# Patient Record
(demographics unavailable — no encounter records)

---

## 2024-10-29 NOTE — ASSESSMENT
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  I have consulted with the pathologist who consulted on the core bx, which was read as mod diff.  However, review of the mast, grade was increased to 3 based on mitotic count on the excision, nuclear grade was 2 in both samplings, Ki-67 was 25% on core and 30% on mast, in retrospect core was representative of the invasive carcinoma and there is no reason to repeat the Oncotype testing.  Oncotype was 15 on core.  Path and prognosis reviewed in detail with the pt.  Pt's risk for distant recurrence using node pos Oncotype 15 is in the range of 14%.  However - case reviewed at Tumor Board with detailed slide review - Pathology slides were reviewed, and case discussed at Tumor Board 01/12/22.  Consensus was to consider chemo.  Aggressive tumor as per pathology review despite Oncotype 15. Data is limited regarding Oncotype in males.  Discussed chemotherapy regimen TC -S/P  - suspect allergic rxn to docetaxel. S/P 1 cycle CTX D1 with abraxane D1, 8,  and 15 - tolerated well. S/P RT to cw and regional LN. Metcalf 6/27/22 - to continue takes occ break for sexual dysfx. Discussed METCALF SE using Viagra. Alternative would be lupron with AI - no sexual fx.Holds Metcalf intermittently for sexual fx. Reviewed exercise diet, and hydration. Continue Metcalf.   Mammogram/US left 12/24. CBC, chem and markers. F/U BP - wnl at home . Colonoscopy with UGI 7/25.Urol eval 12/24. Reviewed BMD in detail. F/U 6 months  [AdvancecareDate] : 10/29/24

## 2024-10-29 NOTE — ASSESSMENT
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  I have consulted with the pathologist who consulted on the core bx, which was read as mod diff.  However, review of the mast, grade was increased to 3 based on mitotic count on the excision, nuclear grade was 2 in both samplings, Ki-67 was 25% on core and 30% on mast, in retrospect core was representative of the invasive carcinoma and there is no reason to repeat the Oncotype testing.  Oncotype was 15 on core.  Path and prognosis reviewed in detail with the pt.  Pt's risk for distant recurrence using node pos Oncotype 15 is in the range of 14%.  However - case reviewed at Tumor Board with detailed slide review - Pathology slides were reviewed, and case discussed at Tumor Board 01/12/22.  Consensus was to consider chemo.  Aggressive tumor as per pathology review despite Oncotype 15. Data is limited regarding Oncotype in males.  Discussed chemotherapy regimen TC -S/P  - suspect allergic rxn to docetaxel. S/P 1 cycle CTX D1 with abraxane D1, 8,  and 15 - tolerated well. S/P RT to cw and regional LN. Metcalf 6/27/22 - to continue takes occ break for sexual dysfx. Discussed METCALF SE using Viagra. Alternative would be lupron with AI - no sexual fx.Holds Metcalf intermittently for sexual fx. Reviewed exercise diet, and hydration. Continue Metcalf.   Mammogram/US left 12/24. CBC, chem and markers. F/U BP - wnl at home . Colonoscopy with UGI 7/25.Urol eval 12/24. Reviewed BMD in detail. F/U 6 months  [AdvancecareDate] : 10/29/24

## 2024-10-29 NOTE — BEGINNING OF VISIT
[[id number]] : 0 [Pain Scale: ___] : On a scale of 1-10, today the patient's pain is a(n) [unfilled]. [Diarrhea Character] : Diarrhea: Grade 0 [0] : 2) Feeling down, depressed, or hopeless: Not at all (0) [Future Reassessment of Pain Scale] : Future reassessment of pain scale [Never] : Never [Reviewed updated] : Reviewed updated [Abdominal Pain] : no abdominal pain [Vomiting] : no vomiting [Constipation] : no constipation

## 2024-10-29 NOTE — ASSESSMENT
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  I have consulted with the pathologist who consulted on the core bx, which was read as mod diff.  However, review of the mast, grade was increased to 3 based on mitotic count on the excision, nuclear grade was 2 in both samplings, Ki-67 was 25% on core and 30% on mast, in retrospect core was representative of the invasive carcinoma and there is no reason to repeat the Oncotype testing.  Oncotype was 15 on core.  Path and prognosis reviewed in detail with the pt.  Pt's risk for distant recurrence using node pos Oncotype 15 is in the range of 14%.  However - case reviewed at Tumor Board with detailed slide review - Pathology slides were reviewed, and case discussed at Tumor Board 01/12/22.  Consensus was to consider chemo.  Aggressive tumor as per pathology review despite Oncotype 15. Data is limited regarding Oncotype in males.  Discussed chemotherapy regimen TC -S/P  - suspect allergic rxn to docetaxel. S/P 1 cycle CTX D1 with abraxane D1, 8,  and 15 - tolerated well. S/P RT to cw and regional LN. Metcalf 6/27/22 - to continue takes occ break for sexual dysfx. Discussed METCALF SE using Viagra. Alternative would be lupron with AI - no sexual fx.Holds Metcalf intermittently for sexual fx. Reviewed exercise diet, and hydration. Continue Metcalf.   Mammogram/US left 12/24. CBC, chem and markers. F/U BP - wnl at home . Colonoscopy with UGI 7/25.Urol eval 12/24. Reviewed BMD in detail. F/U 6 months  [AdvancecareDate] : 10/29/24

## 2024-10-29 NOTE — HISTORY OF PRESENT ILLNESS
[de-identified] : No family Hx of breast CA, pt has personal and family hx prostate CA. Several months ago palpated density Rt breast, subsequently noted nipple retraction.  He promptly sought medical attention and workup included mammo and US, confirmed the presence of a 2.7 cm mass upper outer subareolar region corresponding to the palpable mass, and Lt gynecomastia.  US-guided bx revealed IDC and an axillary LN FNA was consistent with metastatic disease.  Path - mod diff IDC, ER pos, HER-2/caitlin neg.  PET/CT remarkable for Rt breast avidity and Rt axillary adenopathy, minimally avid Rt lower lobe atelectasis/consolidation with associated bronchiectasis, probably inflammatory, attention on F/U, non-avid Rt sclerotic bone lesion, probably benign, less likely metastatic.  21, Rt mast and axillary dissection, poorly diff IDC grade 3, (3, 2, 3) with necrosis, solid papillary features, measures 2.5 cm, involves dermis of the nipple without epidermal involvement, no definite LVI, DCIS focally present with intermediate grade nuclei, margins neg for invasive carcinoma and DCIS.  / SLN pos 0.5 cm,  LNs pos for metastatic disease, 1.4 cm with extranodal extension, ER and [place] >95%, Ki-67 30% and HER-2 2+.  Oncotype testing was done prior to surgery on core bx - ORS 15,  P/O course eventful for seroma -drain ed again last week - has not reaccumulated. Getting PT locally. Pathology slides were reviewed and case discussed at Tumor Board 22 -  Consensus consider chemo.  Pt has an aggressive tumor as per pathology review despite Oncotype 15. Data is limited regarding Oncotype in males.  Pt has been seen for RT. 22 TC#1,   Spoke to the pt tat 7 PM and again at 10:30 PM.  After eating dinner a steak and lettuce, patient felt queasy, had frequent runny watery stool.  At 7 PM, patient took Imodium and again at 10 PM, he took Imodium again.  Patient also felt queasy, he took Ondansetron.  He has no cramping, abdominal pain, fever, urinary symptoms, cough, chest pain or SOB.  Spoke to the pt again at 8:30 AM, 2/1 he noted that he had 1 more BM at 1 AM and again this morning- subsequently resolved. Saw GI on 2/3 - no further intervention.  Did not take Claritin - had mild bone pain. Appetite poor for 1 week - lost 8 lbs - then regained - back to baseline. Fatigue resolved - no significant hair loss. After cycle#2 mild nausea - took ondansetron, fatigue for 7 days, bone pain D3-6 - did not take Claritin, did not have diarrhea. Had anorexia, loss of taste - lost 8 lbs and gained back. All sx resolved. Some hair shedding. S/P  TC 3/9/22. 3/17 had rash on arms and anterior chest wall and neck - seen on Zoom -erythematous macular rash, pruritic, suspect due to chemo.  Pt was advised to see dermatologist, was started on Dexamethasone 4 mg then and 2 mg in pm as well as to take Benadryl 25-50 mg.  Pt was contacted later in the evening, he took 8 mg of Decadron.  He will take 4 mg in pm and 2 mg in am.  Pt noted on , 3/20/22, rash had resolved, Monday had recurred on 3/21/22, initially had itching of his palms and soles, no rash there, then later developed rash on his arms.  Pt was advised to take 4 mg and then 2 mg followed by 2 mg of Decadron.  Pt was seen by dermatologist on 22, rash had resolved. Suspect rash due to docetaxel. Discussed d/c rx after  planned adjuvant rx or to give 1 cycle CTX D1  with wkly abraxane d1, 8, 15 - with cold cap. Discussed concerns re anaphylaxis with continuing taxotere. Reviewed risks, benefits and SE due to rx. S/P 2 3 abraxane- had diarrhea after cycle 1 assoc with steak dinner- did not have after # 2 or 3. S/P RT. On early since 22 - no siginif SE. No fatigue resolved, no hair loss or paresthesia.  Exercising regularly. 22 L mammo/us 7 o'clock 8mm  abn- core bx 22 - benign. 23, saw Derm, precancer, Lt eyebrow, had COVID in February, took Paxlovid.  23, no new complaints.  Saw Dr. Larkin in July.  Tolerating Early without incident.  Scheduled for Derm F/U.  Pt saw urologist in .  PSA reportedly WNL. 1/10/24 Viagra regularly - - mid December - unable to maintain erection.  held Early for 3 days - resolved. Back on early without [id number]/23L mammo/US - [facility]. Urol eval next week. - ? early. 24 Has since taken 2 other breaks - - , ..  saw derm   PMH: , radical prostatectomy, PSA wnl normal limits.  Hx S/P colon resection .  Colonoscopy  with UGI.  Hx colonic polyps.  Hx carotid plaque.  No Hx DVT.  Hx Sampson's, reportedly resolved.  Hx GERD and asthma.  Had  flu vax. Up to date pneumonia vax and shingrix.  Had 3 COVID Pfizer vax, last .  Has healthcare proxy and advance directive. BMD - osteopenia  FAMILY HX: Invitae 47-gene panel neg .[place] descent.  Mother 86, colon CA at 85 and had 2 brothers.  Father  at 75, had 2 brothers and 1 sister.  Pt had 1 brother, prostate CA 66, A&W 72, and has 2 daughters.    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  Exercises 5/week.  Heterosexual and sexually active.  [de-identified] : 10/29/24  Feeling well exercises regularly 1 beer/wk q 45 day holds Metcalf for 4 days - sexual fx improves Urol f/u sched for December Had flu vax and covid vax

## 2024-10-29 NOTE — HISTORY OF PRESENT ILLNESS
[de-identified] : No family Hx of breast CA, pt has personal and family hx prostate CA. Several months ago palpated density Rt breast, subsequently noted nipple retraction.  He promptly sought medical attention and workup included mammo and US, confirmed the presence of a 2.7 cm mass upper outer subareolar region corresponding to the palpable mass, and Lt gynecomastia.  US-guided bx revealed IDC and an axillary LN FNA was consistent with metastatic disease.  Path - mod diff IDC, ER pos, HER-2/caitlin neg.  PET/CT remarkable for Rt breast avidity and Rt axillary adenopathy, minimally avid Rt lower lobe atelectasis/consolidation with associated bronchiectasis, probably inflammatory, attention on F/U, non-avid Rt sclerotic bone lesion, probably benign, less likely metastatic.  21, Rt mast and axillary dissection, poorly diff IDC grade 3, (3, 2, 3) with necrosis, solid papillary features, measures 2.5 cm, involves dermis of the nipple without epidermal involvement, no definite LVI, DCIS focally present with intermediate grade nuclei, margins neg for invasive carcinoma and DCIS.  / SLN pos 0.5 cm,  LNs pos for metastatic disease, 1.4 cm with extranodal extension, ER and [place] >95%, Ki-67 30% and HER-2 2+.  Oncotype testing was done prior to surgery on core bx - ORS 15,  P/O course eventful for seroma -drain ed again last week - has not reaccumulated. Getting PT locally. Pathology slides were reviewed and case discussed at Tumor Board 22 -  Consensus consider chemo.  Pt has an aggressive tumor as per pathology review despite Oncotype 15. Data is limited regarding Oncotype in males.  Pt has been seen for RT. 22 TC#1,   Spoke to the pt tat 7 PM and again at 10:30 PM.  After eating dinner a steak and lettuce, patient felt queasy, had frequent runny watery stool.  At 7 PM, patient took Imodium and again at 10 PM, he took Imodium again.  Patient also felt queasy, he took Ondansetron.  He has no cramping, abdominal pain, fever, urinary symptoms, cough, chest pain or SOB.  Spoke to the pt again at 8:30 AM, 2/1 he noted that he had 1 more BM at 1 AM and again this morning- subsequently resolved. Saw GI on 2/3 - no further intervention.  Did not take Claritin - had mild bone pain. Appetite poor for 1 week - lost 8 lbs - then regained - back to baseline. Fatigue resolved - no significant hair loss. After cycle#2 mild nausea - took ondansetron, fatigue for 7 days, bone pain D3-6 - did not take Claritin, did not have diarrhea. Had anorexia, loss of taste - lost 8 lbs and gained back. All sx resolved. Some hair shedding. S/P  TC 3/9/22. 3/17 had rash on arms and anterior chest wall and neck - seen on Zoom -erythematous macular rash, pruritic, suspect due to chemo.  Pt was advised to see dermatologist, was started on Dexamethasone 4 mg then and 2 mg in pm as well as to take Benadryl 25-50 mg.  Pt was contacted later in the evening, he took 8 mg of Decadron.  He will take 4 mg in pm and 2 mg in am.  Pt noted on , 3/20/22, rash had resolved, Monday had recurred on 3/21/22, initially had itching of his palms and soles, no rash there, then later developed rash on his arms.  Pt was advised to take 4 mg and then 2 mg followed by 2 mg of Decadron.  Pt was seen by dermatologist on 22, rash had resolved. Suspect rash due to docetaxel. Discussed d/c rx after  planned adjuvant rx or to give 1 cycle CTX D1  with wkly abraxane d1, 8, 15 - with cold cap. Discussed concerns re anaphylaxis with continuing taxotere. Reviewed risks, benefits and SE due to rx. S/P 2 3 abraxane- had diarrhea after cycle 1 assoc with steak dinner- did not have after # 2 or 3. S/P RT. On early since 22 - no siginif SE. No fatigue resolved, no hair loss or paresthesia.  Exercising regularly. 22 L mammo/us 7 o'clock 8mm  abn- core bx 22 - benign. 23, saw Derm, precancer, Lt eyebrow, had COVID in February, took Paxlovid.  23, no new complaints.  Saw Dr. Larkin in July.  Tolerating Early without incident.  Scheduled for Derm F/U.  Pt saw urologist in .  PSA reportedly WNL. 1/10/24 Viagra regularly - - mid December - unable to maintain erection.  held Early for 3 days - resolved. Back on early without [id number]/23L mammo/US - [facility]. Urol eval next week. - ? early. 24 Has since taken 2 other breaks - - , ..  saw derm   PMH: , radical prostatectomy, PSA wnl normal limits.  Hx S/P colon resection .  Colonoscopy  with UGI.  Hx colonic polyps.  Hx carotid plaque.  No Hx DVT.  Hx Sampson's, reportedly resolved.  Hx GERD and asthma.  Had  flu vax. Up to date pneumonia vax and shingrix.  Had 3 COVID Pfizer vax, last .  Has healthcare proxy and advance directive. BMD - osteopenia  FAMILY HX: Invitae 47-gene panel neg .[place] descent.  Mother 86, colon CA at 85 and had 2 brothers.  Father  at 75, had 2 brothers and 1 sister.  Pt had 1 brother, prostate CA 66, A&W 72, and has 2 daughters.    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  Exercises 5/week.  Heterosexual and sexually active.  [de-identified] : 10/29/24  Feeling well exercises regularly 1 beer/wk q 45 day holds Metcalf for 4 days - sexual fx improves Urol f/u sched for December Had flu vax and covid vax

## 2024-10-29 NOTE — PHYSICAL EXAM
[Fully active, able to carry on all pre-disease performance without restriction] : Status 0 - Fully active, able to carry on all pre-disease performance without restriction [Normal] : affect appropriate [de-identified] : S/P Rt mast, cw neg, S/P Rt alnd, L unremarkable, no palp LN

## 2024-10-29 NOTE — HISTORY OF PRESENT ILLNESS
[de-identified] : No family Hx of breast CA, pt has personal and family hx prostate CA. Several months ago palpated density Rt breast, subsequently noted nipple retraction.  He promptly sought medical attention and workup included mammo and US, confirmed the presence of a 2.7 cm mass upper outer subareolar region corresponding to the palpable mass, and Lt gynecomastia.  US-guided bx revealed IDC and an axillary LN FNA was consistent with metastatic disease.  Path - mod diff IDC, ER pos, HER-2/caitlin neg.  PET/CT remarkable for Rt breast avidity and Rt axillary adenopathy, minimally avid Rt lower lobe atelectasis/consolidation with associated bronchiectasis, probably inflammatory, attention on F/U, non-avid Rt sclerotic bone lesion, probably benign, less likely metastatic.  21, Rt mast and axillary dissection, poorly diff IDC grade 3, (3, 2, 3) with necrosis, solid papillary features, measures 2.5 cm, involves dermis of the nipple without epidermal involvement, no definite LVI, DCIS focally present with intermediate grade nuclei, margins neg for invasive carcinoma and DCIS.  / SLN pos 0.5 cm,  LNs pos for metastatic disease, 1.4 cm with extranodal extension, ER and [place] >95%, Ki-67 30% and HER-2 2+.  Oncotype testing was done prior to surgery on core bx - ORS 15,  P/O course eventful for seroma -drain ed again last week - has not reaccumulated. Getting PT locally. Pathology slides were reviewed and case discussed at Tumor Board 22 -  Consensus consider chemo.  Pt has an aggressive tumor as per pathology review despite Oncotype 15. Data is limited regarding Oncotype in males.  Pt has been seen for RT. 22 TC#1,   Spoke to the pt tat 7 PM and again at 10:30 PM.  After eating dinner a steak and lettuce, patient felt queasy, had frequent runny watery stool.  At 7 PM, patient took Imodium and again at 10 PM, he took Imodium again.  Patient also felt queasy, he took Ondansetron.  He has no cramping, abdominal pain, fever, urinary symptoms, cough, chest pain or SOB.  Spoke to the pt again at 8:30 AM, 2/1 he noted that he had 1 more BM at 1 AM and again this morning- subsequently resolved. Saw GI on 2/3 - no further intervention.  Did not take Claritin - had mild bone pain. Appetite poor for 1 week - lost 8 lbs - then regained - back to baseline. Fatigue resolved - no significant hair loss. After cycle#2 mild nausea - took ondansetron, fatigue for 7 days, bone pain D3-6 - did not take Claritin, did not have diarrhea. Had anorexia, loss of taste - lost 8 lbs and gained back. All sx resolved. Some hair shedding. S/P  TC 3/9/22. 3/17 had rash on arms and anterior chest wall and neck - seen on Zoom -erythematous macular rash, pruritic, suspect due to chemo.  Pt was advised to see dermatologist, was started on Dexamethasone 4 mg then and 2 mg in pm as well as to take Benadryl 25-50 mg.  Pt was contacted later in the evening, he took 8 mg of Decadron.  He will take 4 mg in pm and 2 mg in am.  Pt noted on , 3/20/22, rash had resolved, Monday had recurred on 3/21/22, initially had itching of his palms and soles, no rash there, then later developed rash on his arms.  Pt was advised to take 4 mg and then 2 mg followed by 2 mg of Decadron.  Pt was seen by dermatologist on 22, rash had resolved. Suspect rash due to docetaxel. Discussed d/c rx after  planned adjuvant rx or to give 1 cycle CTX D1  with wkly abraxane d1, 8, 15 - with cold cap. Discussed concerns re anaphylaxis with continuing taxotere. Reviewed risks, benefits and SE due to rx. S/P 2 3 abraxane- had diarrhea after cycle 1 assoc with steak dinner- did not have after # 2 or 3. S/P RT. On early since 22 - no siginif SE. No fatigue resolved, no hair loss or paresthesia.  Exercising regularly. 22 L mammo/us 7 o'clock 8mm  abn- core bx 22 - benign. 23, saw Derm, precancer, Lt eyebrow, had COVID in February, took Paxlovid.  23, no new complaints.  Saw Dr. Larkin in July.  Tolerating Early without incident.  Scheduled for Derm F/U.  Pt saw urologist in .  PSA reportedly WNL. 1/10/24 Viagra regularly - - mid December - unable to maintain erection.  held Early for 3 days - resolved. Back on early without [id number]/11/23L mammo/US - [facility]. Urol eval next week. - ? early. 24 Has since taken 2 other breaks - - , ..  saw derm   PMH: , radical prostatectomy, PSA wnl normal limits.  Hx S/P colon resection .  Colonoscopy  with UGI.  Hx colonic polyps.  Hx carotid plaque.  No Hx DVT.  Hx Sampson's, reportedly resolved.  Hx GERD and asthma.  Had  flu vax. Up to date pneumonia vax and shingrix.  Had 3 COVID Pfizer vax, last .  Has healthcare proxy and advance directive. BMD - osteopenia  FAMILY HX: Invitae 47-gene panel neg .[place] descent.  Mother 86, colon CA at 85 and had 2 brothers.  Father  at 75, had 2 brothers and 1 sister.  Pt had 1 brother, prostate CA 66, A&W 72, and has 2 daughters.    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  Exercises 5/week.  Heterosexual and sexually active.  [de-identified] : 10/29/24  Feeling well exercises regularly 1 beer/wk q 45 day holds Metcalf for 4 days - sexual fx improves Urol f/u sched for December Had flu vax and covid vax

## 2024-10-29 NOTE — BEGINNING OF VISIT
[[id number]] : 0 [Pain Scale: ___] : On a scale of 1-10, today the patient's pain is a(n) [unfilled]. [Diarrhea Character] : Diarrhea: Grade 0 [0] : 2) Feeling down, depressed, or hopeless: Not at all (0) [Future Reassessment of Pain Scale] : Future reassessment of pain scale [Never] : Never [Reviewed updated] : Reviewed updated [Abdominal Pain] : no abdominal pain [Vomiting] : no vomiting [Constipation] : no constipation

## 2024-10-29 NOTE — PHYSICAL EXAM
[Fully active, able to carry on all pre-disease performance without restriction] : Status 0 - Fully active, able to carry on all pre-disease performance without restriction [Normal] : affect appropriate [de-identified] : S/P Rt mast, cw neg, S/P Rt alnd, L unremarkable, no palp LN

## 2024-10-29 NOTE — HISTORY OF PRESENT ILLNESS
[de-identified] : No family Hx of breast CA, pt has personal and family hx prostate CA. Several months ago palpated density Rt breast, subsequently noted nipple retraction.  He promptly sought medical attention and workup included mammo and US, confirmed the presence of a 2.7 cm mass upper outer subareolar region corresponding to the palpable mass, and Lt gynecomastia.  US-guided bx revealed IDC and an axillary LN FNA was consistent with metastatic disease.  Path - mod diff IDC, ER pos, HER-2/caitlin neg.  PET/CT remarkable for Rt breast avidity and Rt axillary adenopathy, minimally avid Rt lower lobe atelectasis/consolidation with associated bronchiectasis, probably inflammatory, attention on F/U, non-avid Rt sclerotic bone lesion, probably benign, less likely metastatic.  21, Rt mast and axillary dissection, poorly diff IDC grade 3, (3, 2, 3) with necrosis, solid papillary features, measures 2.5 cm, involves dermis of the nipple without epidermal involvement, no definite LVI, DCIS focally present with intermediate grade nuclei, margins neg for invasive carcinoma and DCIS.  / SLN pos 0.5 cm,  LNs pos for metastatic disease, 1.4 cm with extranodal extension, ER and TN >95%, Ki-67 30% and HER-2 2+.  Oncotype testing was done prior to surgery on core bx - ORS 15,  P/O course eventful for seroma -drain ed again last week - has not reaccumulated. Getting PT locally. Pathology slides were reviewed and case discussed at Tumor Board 22 -  Consensus consider chemo.  Pt has an aggressive tumor as per pathology review despite Oncotype 15. Data is limited regarding Oncotype in males.  Pt has been seen for RT. 22 TC#1,   Spoke to the pt tat 7 PM and again at 10:30 PM.  After eating dinner a steak and lettuce, patient felt queasy, had frequent runny watery stool.  At 7 PM, patient took Imodium and again at 10 PM, he took Imodium again.  Patient also felt queasy, he took Ondansetron.  He has no cramping, abdominal pain, fever, urinary symptoms, cough, chest pain or SOB.  Spoke to the pt again at 8:30 AM, 2/1 he noted that he had 1 more BM at 1 AM and again this morning- subsequently resolved. Saw GI on 2/3 - no further intervention.  Did not take Claritin - had mild bone pain. Appetite poor for 1 week - lost 8 lbs - then regained - back to baseline. Fatigue resolved - no significant hair loss. After cycle#2 mild nausea - took ondansetron, fatigue for 7 days, bone pain D3-6 - did not take Claritin, did not have diarrhea. Had anorexia, loss of taste - lost 8 lbs and gained back. All sx resolved. Some hair shedding. S/P  TC 3/9/22. 3/17 had rash on arms and anterior chest wall and neck - seen on Zoom -erythematous macular rash, pruritic, suspect due to chemo.  Pt was advised to see dermatologist, was started on Dexamethasone 4 mg then and 2 mg in pm as well as to take Benadryl 25-50 mg.  Pt was contacted later in the evening, he took 8 mg of Decadron.  He will take 4 mg in pm and 2 mg in am.  Pt noted on , 3/20/22, rash had resolved, Monday had recurred on 3/21/22, initially had itching of his palms and soles, no rash there, then later developed rash on his arms.  Pt was advised to take 4 mg and then 2 mg followed by 2 mg of Decadron.  Pt was seen by dermatologist on 22, rash had resolved. Suspect rash due to docetaxel. Discussed d/c rx after  planned adjuvant rx or to give 1 cycle CTX D1  with wkly abraxane d1, 8, 15 - with cold cap. Discussed concerns re anaphylaxis with continuing taxotere. Reviewed risks, benefits and SE due to rx. S/P 2 3 abraxane- had diarrhea after cycle 1 assoc with steak dinner- did not have after # 2 or 3. S/P RT. On early since 22 - no siginif SE. No fatigue resolved, no hair loss or paresthesia.  Exercising regularly. 22 L mammo/us 7 o'clock 8mm  abn- core bx 22 - benign. 23, saw Derm, precancer, Lt eyebrow, had COVID in February, took Paxlovid.  23, no new complaints.  Saw Dr. Larkin in July.  Tolerating Early without incident.  Scheduled for Derm F/U.  Pt saw urologist in .  PSA reportedly WNL. 1/10/24 Viagra regularly - - mid December - unable to maintain erection.  held Early for 3 days - resolved. Back on early without [id number]/23L mammo/US - [facility]. Urol eval next week. - ? early. 24 Has since taken 2 other breaks - - , ..  saw derm   PMH: , radical prostatectomy, PSA wnl normal limits.  Hx S/P colon resection .  Colonoscopy  with UGI.  Hx colonic polyps.  Hx carotid plaque.  No Hx DVT.  Hx Sampson's, reportedly resolved.  Hx GERD and asthma.  Had  flu vax. Up to date pneumonia vax and shingrix.  Had 3 COVID Pfizer vax, last .  Has healthcare proxy and advance directive. BMD - osteopenia  FAMILY HX: Invitae 47-gene panel neg .[place] descent.  Mother 86, colon CA at 85 and had 2 brothers.  Father  at 75, had 2 brothers and 1 sister.  Pt had 1 brother, prostate CA 66, A&W 72, and has 2 daughters.    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  Exercises 5/week.  Heterosexual and sexually active.  [de-identified] : 10/29/24  Feeling well exercises regularly 1 beer/wk q 45 day holds Metcalf for 4 days - sexual fx improves Urol f/u sched for December Had flu vax and covid vax

## 2024-10-29 NOTE — PHYSICAL EXAM
[Fully active, able to carry on all pre-disease performance without restriction] : Status 0 - Fully active, able to carry on all pre-disease performance without restriction [Normal] : affect appropriate [de-identified] : S/P Rt mast, cw neg, S/P Rt alnd, L unremarkable, no palp LN

## 2024-10-29 NOTE — PHYSICAL EXAM
[Fully active, able to carry on all pre-disease performance without restriction] : Status 0 - Fully active, able to carry on all pre-disease performance without restriction [Normal] : affect appropriate [de-identified] : S/P Rt mast, cw neg, S/P Rt alnd, L unremarkable, no palp LN

## 2024-10-29 NOTE — HISTORY OF PRESENT ILLNESS
[de-identified] : No family Hx of breast CA, pt has personal and family hx prostate CA. Several months ago palpated density Rt breast, subsequently noted nipple retraction.  He promptly sought medical attention and workup included mammo and US, confirmed the presence of a 2.7 cm mass upper outer subareolar region corresponding to the palpable mass, and Lt gynecomastia.  US-guided bx revealed IDC and an axillary LN FNA was consistent with metastatic disease.  Path - mod diff IDC, ER pos, HER-2/caitlin neg.  PET/CT remarkable for Rt breast avidity and Rt axillary adenopathy, minimally avid Rt lower lobe atelectasis/consolidation with associated bronchiectasis, probably inflammatory, attention on F/U, non-avid Rt sclerotic bone lesion, probably benign, less likely metastatic.  21, Rt mast and axillary dissection, poorly diff IDC grade 3, (3, 2, 3) with necrosis, solid papillary features, measures 2.5 cm, involves dermis of the nipple without epidermal involvement, no definite LVI, DCIS focally present with intermediate grade nuclei, margins neg for invasive carcinoma and DCIS.  / SLN pos 0.5 cm,  LNs pos for metastatic disease, 1.4 cm with extranodal extension, ER and NH >95%, Ki-67 30% and HER-2 2+.  Oncotype testing was done prior to surgery on core bx - ORS 15,  P/O course eventful for seroma -drain ed again last week - has not reaccumulated. Getting PT locally. Pathology slides were reviewed and case discussed at Tumor Board 22 -  Consensus consider chemo.  Pt has an aggressive tumor as per pathology review despite Oncotype 15. Data is limited regarding Oncotype in males.  Pt has been seen for RT. 22 TC#1,   Spoke to the pt tat 7 PM and again at 10:30 PM.  After eating dinner a steak and lettuce, patient felt queasy, had frequent runny watery stool.  At 7 PM, patient took Imodium and again at 10 PM, he took Imodium again.  Patient also felt queasy, he took Ondansetron.  He has no cramping, abdominal pain, fever, urinary symptoms, cough, chest pain or SOB.  Spoke to the pt again at 8:30 AM, 2/1 he noted that he had 1 more BM at 1 AM and again this morning- subsequently resolved. Saw GI on 2/3 - no further intervention.  Did not take Claritin - had mild bone pain. Appetite poor for 1 week - lost 8 lbs - then regained - back to baseline. Fatigue resolved - no significant hair loss. After cycle#2 mild nausea - took ondansetron, fatigue for 7 days, bone pain D3-6 - did not take Claritin, did not have diarrhea. Had anorexia, loss of taste - lost 8 lbs and gained back. All sx resolved. Some hair shedding. S/P  TC 3/9/22. 3/17 had rash on arms and anterior chest wall and neck - seen on Zoom -erythematous macular rash, pruritic, suspect due to chemo.  Pt was advised to see dermatologist, was started on Dexamethasone 4 mg then and 2 mg in pm as well as to take Benadryl 25-50 mg.  Pt was contacted later in the evening, he took 8 mg of Decadron.  He will take 4 mg in pm and 2 mg in am.  Pt noted on , 3/20/22, rash had resolved, Monday had recurred on 3/21/22, initially had itching of his palms and soles, no rash there, then later developed rash on his arms.  Pt was advised to take 4 mg and then 2 mg followed by 2 mg of Decadron.  Pt was seen by dermatologist on 22, rash had resolved. Suspect rash due to docetaxel. Discussed d/c rx after  planned adjuvant rx or to give 1 cycle CTX D1  with wkly abraxane d1, 8, 15 - with cold cap. Discussed concerns re anaphylaxis with continuing taxotere. Reviewed risks, benefits and SE due to rx. S/P 2 3 abraxane- had diarrhea after cycle 1 assoc with steak dinner- did not have after # 2 or 3. S/P RT. On early since 22 - no siginif SE. No fatigue resolved, no hair loss or paresthesia.  Exercising regularly. 22 L mammo/us 7 o'clock 8mm  abn- core bx 22 - benign. 23, saw Derm, precancer, Lt eyebrow, had COVID in February, took Paxlovid.  23, no new complaints.  Saw Dr. Larkin in July.  Tolerating Early without incident.  Scheduled for Derm F/U.  Pt saw urologist in .  PSA reportedly WNL. 1/10/24 Viagra regularly - - mid December - unable to maintain erection.  held Early for 3 days - resolved. Back on early without [id number]/11/23L mammo/US - [facility]. Urol eval next week. - ? early. 24 Has since taken 2 other breaks - - , ..  saw derm   PMH: , radical prostatectomy, PSA wnl normal limits.  Hx S/P colon resection .  Colonoscopy  with UGI.  Hx colonic polyps.  Hx carotid plaque.  No Hx DVT.  Hx Sampson's, reportedly resolved.  Hx GERD and asthma.  Had  flu vax. Up to date pneumonia vax and shingrix.  Had 3 COVID Pfizer vax, last .  Has healthcare proxy and advance directive. BMD - osteopenia  FAMILY HX: Invitae 47-gene panel neg .[place] descent.  Mother 86, colon CA at 85 and had 2 brothers.  Father  at 75, had 2 brothers and 1 sister.  Pt had 1 brother, prostate CA 66, A&W 72, and has 2 daughters.    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  Exercises 5/week.  Heterosexual and sexually active.  [de-identified] : 10/29/24  Feeling well exercises regularly 1 beer/wk q 45 day holds Metcalf for 4 days - sexual fx improves Urol f/u sched for December Had flu vax and covid vax

## 2024-10-29 NOTE — PHYSICAL EXAM
[Fully active, able to carry on all pre-disease performance without restriction] : Status 0 - Fully active, able to carry on all pre-disease performance without restriction [Normal] : affect appropriate [de-identified] : S/P Rt mast, cw neg, S/P Rt alnd, L unremarkable, no palp LN

## 2024-10-29 NOTE — ASSESSMENT
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  I have consulted with the pathologist who consulted on the core bx, which was read as mod diff.  However, review of the mast, grade was increased to 3 based on mitotic count on the excision, nuclear grade was 2 in both samplings, Ki-67 was 25% on core and 30% on mast, in retrospect core was representative of the invasive carcinoma and there is no reason to repeat the Oncotype testing.  Oncotype was 15 on core.  Path and prognosis reviewed in detail with the pt.  Pt's risk for distant recurrence using node pos Oncotype 15 is in the range of 14%.  However - case reviewed at Tumor Board with detailed slide review - Pathology slides were reviewed, and case discussed at Tumor Board 01/12/22.  Consensus was to consider chemo.  Aggressive tumor as per pathology review despite Oncotype 15. Data is limited regarding Oncotype in males.  Discussed chemotherapy regimen TC -S/P  - suspect allergic rxn to docetaxel. S/P 1 cycle CTX D1 with abraxane D1, 8,  and 15 - tolerated well. S/P RT to cw and regional LN. Metcalf 6/27/22 - to continue takes occ break for sexual dysfx. Discussed METCALF SE using Viagra. Alternative would be lupron with AI - no sexual fx.Holds Metcalf intermittently for sexual fx. Reviewed exercise diet, and hydration. Continue Metcalf.   Mammogram/US left 12/24. CBC, chem and markers. F/U BP - wnl at home . Colonoscopy with UGI 7/25.Urol eval 12/24. Reviewed BMD in detail. F/U 6 months  [AdvancecareDate] : 10/29/24

## 2024-10-29 NOTE — BEGINNING OF VISIT
[[id number]] : 0 [Pain Scale: ___] : On a scale of 1-10, today the patient's pain is a(n) [unfilled]. [Diarrhea Character] : Diarrhea: Grade 0 [0] : 2) Feeling down, depressed, or hopeless: Not at all (0) [Future Reassessment of Pain Scale] : Future reassessment of pain scale [Never] : Never [Reviewed updated] : Reviewed updated [Abdominal Pain] : no abdominal pain [Vomiting] : no vomiting [Constipation] : no constipation

## 2024-10-29 NOTE — BEGINNING OF VISIT
[[id number]] : 0 [Pain Scale: ___] : On a scale of 1-10, today the patient's pain is a(n) [unfilled]. [Diarrhea Character] : Diarrhea: Grade 0 [0] : 2) Feeling down, depressed, or hopeless: Not at all (0) [Future Reassessment of Pain Scale] : Future reassessment of pain scale [Never] : Never [Reviewed updated] : Reviewed updated [Abdominal Pain] : no abdominal pain [Vomiting] : no vomiting [Constipation] : no constipation

## 2024-10-29 NOTE — ASSESSMENT
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  I have consulted with the pathologist who consulted on the core bx, which was read as mod diff.  However, review of the mast, grade was increased to 3 based on mitotic count on the excision, nuclear grade was 2 in both samplings, Ki-67 was 25% on core and 30% on mast, in retrospect core was representative of the invasive carcinoma and there is no reason to repeat the Oncotype testing.  Oncotype was 15 on core.  Path and prognosis reviewed in detail with the pt.  Pt's risk for distant recurrence using node pos Oncotype 15 is in the range of 14%.  However - case reviewed at Tumor Board with detailed slide review - Pathology slides were reviewed, and case discussed at Tumor Board 01/12/22.  Consensus was to consider chemo.  Aggressive tumor as per pathology review despite Oncotype 15. Data is limited regarding Oncotype in males.  Discussed chemotherapy regimen TC -S/P  - suspect allergic rxn to docetaxel. S/P 1 cycle CTX D1 with abraxane D1, 8,  and 15 - tolerated well. S/P RT to cw and regional LN. Metcalf 6/27/22 - to continue takes occ break for sexual dysfx. Discussed METCALF SE using Viagra. Alternative would be lupron with AI - no sexual fx.Holds Metcalf intermittently for sexual fx. Reviewed exercise diet, and hydration. Continue Metcalf.   Mammogram/US left 12/24. CBC, chem and markers. F/U BP - wnl at home . Colonoscopy with UGI 7/25.Urol eval 12/24. Reviewed BMD in detail. F/U 6 months  [AdvancecareDate] : 10/29/24

## 2024-10-29 NOTE — BEGINNING OF VISIT
[[id number]] : 0 [Pain Scale: ___] : On a scale of 1-10, today the patient's pain is a(n) [unfilled]. [Diarrhea Character] : Diarrhea: Grade 0 [0] : 2) Feeling down, depressed, or hopeless: Not at all (0) [Future Reassessment of Pain Scale] : Future reassessment of pain scale [Never] : Never [Reviewed updated] : Reviewed updated [Abdominal Pain] : no abdominal pain [Vomiting] : no vomiting [Constipation] : no constipation

## 2025-05-29 NOTE — HISTORY OF PRESENT ILLNESS
[de-identified] : No family Hx of breast CA, pt has personal and family hx prostate CA. Several months ago palpated density Rt breast, subsequently noted nipple retraction.  He promptly sought medical attention and workup included mammo and US, confirmed the presence of a 2.7 cm mass upper outer subareolar region corresponding to the palpable mass, and Lt gynecomastia.  US-guided bx revealed IDC and an axillary LN FNA was consistent with metastatic disease.  Path - mod diff IDC, ER pos, HER-2/caitlin neg.  PET/CT remarkable for Rt breast avidity and Rt axillary adenopathy, minimally avid Rt lower lobe atelectasis/consolidation with associated bronchiectasis, probably inflammatory, attention on F/U, non-avid Rt sclerotic bone lesion, probably benign, less likely metastatic.  21, Rt mast and axillary dissection, poorly diff IDC grade 3, (3, 2, 3) with necrosis, solid papillary features, measures 2.5 cm, involves dermis of the nipple without epidermal involvement, no definite LVI, DCIS focally present with intermediate grade nuclei, margins neg for invasive carcinoma and DCIS.  / SLN pos 0.5 cm,  LNs pos for metastatic disease, 1.4 cm with extranodal extension, ER and OR >95%, Ki-67 30% and HER-2 2+.  Oncotype testing was done prior to surgery on core bx - ORS 15,  P/O course eventful for seroma -drain ed again last week - has not reaccumulated. Getting PT locally. Pathology slides were reviewed and case discussed at Tumor Board 22 -  Consensus consider chemo.  Pt has an aggressive tumor as per pathology review despite Oncotype 15. Data is limited regarding Oncotype in males.  Pt has been seen for RT. 22 TC#1,   Spoke to the pt tat 7 PM and again at 10:30 PM.  After eating dinner a steak and lettuce, patient felt queasy, had frequent runny watery stool.  At 7 PM, patient took Imodium and again at 10 PM, he took Imodium again.  Patient also felt queasy, he took Ondansetron.  He has no cramping, abdominal pain, fever, urinary symptoms, cough, chest pain or SOB.  Spoke to the pt again at 8:30 AM, 2/1 he noted that he had 1 more BM at 1 AM and again this morning- subsequently resolved. Saw GI on 2/3 - no further intervention.  Did not take Claritin - had mild bone pain. Appetite poor for 1 week - lost 8 lbs - then regained - back to baseline. Fatigue resolved - no significant hair loss. After cycle#2 mild nausea - took ondansetron, fatigue for 7 days, bone pain D3-6 - did not take Claritin, did not have diarrhea. Had anorexia, loss of taste - lost 8 lbs and gained back. All sx resolved. Some hair shedding. S/P  TC 3/9/22. 3/17 had rash on arms and anterior chest wall and neck - seen on Zoom -erythematous macular rash, pruritic, suspect due to chemo.  Pt was advised to see dermatologist, was started on Dexamethasone 4 mg then and 2 mg in pm as well as to take Benadryl 25-50 mg.  Pt was contacted later in the evening, he took 8 mg of Decadron.  He will take 4 mg in pm and 2 mg in am.  Pt noted on , 3/20/22, rash had resolved, Monday had recurred on 3/21/22, initially had itching of his palms and soles, no rash there, then later developed rash on his arms.  Pt was advised to take 4 mg and then 2 mg followed by 2 mg of Decadron.  Pt was seen by dermatologist on 22, rash had resolved. Suspect rash due to docetaxel. Discussed d/c rx after  planned adjuvant rx or to give 1 cycle CTX D1  with wkly abraxane d1, 8, 15 - with cold cap. Discussed concerns re anaphylaxis with continuing taxotere. Reviewed risks, benefits and SE due to rx. S/P 2 3 abraxane- had diarrhea after cycle 1 assoc with steak dinner- did not have after # 2 or 3. S/P RT. On early since 22 - no siginif SE. No fatigue resolved, no hair loss or paresthesia.  Exercising regularly. 22 L mammo/us 7 o'clock 8mm  abn- core bx 22 - benign. 23, saw Derm, precancer, Lt eyebrow, had COVID in February, took Paxlovid.  23, no new complaints.  Saw Dr. Larkin in July.  Tolerating Early without incident.  Scheduled for Derm F/U.  Pt saw urologist in .  PSA reportedly WNL. 1/10/24 Viagra regularly - - mid December - unable to maintain erection.  held Early for 3 days - resolved. Back on early without [id number]/23L mammo/US - [facility]. Urol eval next week. - ? early. 24 Has since taken 2 other breaks - - , ..  saw derm   PMH: , radical prostatectomy, PSA wnl normal limits.  Hx S/P colon resection .  Colonoscopy  with UGI.  Hx colonic polyps.  Hx carotid plaque.  No Hx DVT.  Hx Sampson's, reportedly resolved.  Hx GERD and asthma.  Had  flu vax. Up to date pneumonia vax and shingrix.  Had 3 COVID Pfizer vax, last .  Has healthcare proxy and advance directive. BMD - osteopenia  FAMILY HX: Invitae 47-gene panel neg .[place] descent.  Mother 86, colon CA at 85 and had 2 brothers.  Father  at 75, had 2 brothers and 1 sister.  Pt had 1 brother, prostate CA 66, A&W 72, and has 2 daughters.    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  Exercises 5/week.  Heterosexual and sexually active.  [de-identified] : 10/29/24  Feeling well exercises regularly 1 beer/wk q 45 day holds Metcalf for 4 days - sexual fx improves Urol f/u sched for December Had flu vax and covid vax  5/29/25 BMD 4/9/24 @ [facility] - lumbar spine normal, Lt hip osteopenia Lt MMG/US 12/11/24 @ [facility] in [place] - maria alejandra in Lt breast continues tamoxifen 20mg - holds for a few days periodically d/t sexual dysfunction, continues Viagra colonoscopy w/ UGI 04/2025 - wnl, hx polyps, has a f/u in 07/2025 up to date w/ derm 4/3/25 - wnl saw ophthalmology 06/2024 - annual f/u urology - PSA checked 01/17/25 weight-lifting everyday weight/appetite stable 1 beer per week getting calcium through diet - 12oz glass of milk per day brother had gallbladder and liver cancer - surgery at [facility], seeing an oncologist for possible chemo

## 2025-05-29 NOTE — ASSESSMENT
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  I have consulted with the pathologist who consulted on the core bx, which was read as mod diff.  However, review of the mast, grade was increased to 3 based on mitotic count on the excision, nuclear grade was 2 in both samplings, Ki-67 was 25% on core and 30% on mast, in retrospect core was representative of the invasive carcinoma and there is no reason to repeat the Oncotype testing.  Oncotype was 15 on core.  Path and prognosis reviewed in detail with the pt.  Pt's risk for distant recurrence using node pos Oncotype 15 is in the range of 14%.  However - case reviewed at Tumor Board with detailed slide review - Pathology slides were reviewed, and case discussed at Tumor Board 01/12/22.  Consensus was to consider chemo.  Aggressive tumor as per pathology review despite Oncotype 15. Data is limited regarding Oncotype in males.  Discussed chemotherapy regimen TC -S/P  - suspect allergic rxn to docetaxel. S/P 1 cycle CTX D1 with abraxane D1, 8,  and 15 - tolerated well. S/P RT to cw and regional LN. Metcalf 6/27/22 - to continue takes occ break for sexual dysfx. Discussed METCALF SE using Viagra. Alternative would be lupron with AI - no sexual fx.. Reviewed exercise diet, and hydration. Continue Metcalf.   Mammogram/US left 12/24. CBC, chem and markers. F/U BP - wnl at home . Urol eval 1/25.F/U 6 months  [AdvancecareDate] : 5/29/25

## 2025-05-29 NOTE — ASSESSMENT
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  I have consulted with the pathologist who consulted on the core bx, which was read as mod diff.  However, review of the mast, grade was increased to 3 based on mitotic count on the excision, nuclear grade was 2 in both samplings, Ki-67 was 25% on core and 30% on mast, in retrospect core was representative of the invasive carcinoma and there is no reason to repeat the Oncotype testing.  Oncotype was 15 on core.  Path and prognosis reviewed in detail with the pt.  Pt's risk for distant recurrence using node pos Oncotype 15 is in the range of 14%.  However - case reviewed at Tumor Board with detailed slide review - Pathology slides were reviewed, and case discussed at Tumor Board 01/12/22.  Consensus was to consider chemo.  Aggressive tumor as per pathology review despite Oncotype 15. Data is limited regarding Oncotype in males.  Discussed chemotherapy regimen TC -S/P  - suspect allergic rxn to docetaxel. S/P 1 cycle CTX D1 with abraxane D1, 8,  and 15 - tolerated well. S/P RT to cw and regional LN. Metcalf 6/27/22 - to continue takes occ break for sexual dysfx. Discussed METCALF SE using Viagra. Alternative would be lupron with AI - no sexual fx.. Reviewed exercise diet, and hydration. Continue Metcalf.   Mammogram/US left 12/24. CBC, chem and markers. F/U BP - wnl at home . Urol eval 1/25.F/U 6 months  [AdvancecareDate] : 5/29/25

## 2025-05-29 NOTE — PHYSICAL EXAM
[Fully active, able to carry on all pre-disease performance without restriction] : Status 0 - Fully active, able to carry on all pre-disease performance without restriction [Normal] : affect appropriate [de-identified] : S/P Rt mast, cw neg, S/P Rt alnd, L unremarkable, no palp mass or LN

## 2025-05-29 NOTE — PHYSICAL EXAM
[Fully active, able to carry on all pre-disease performance without restriction] : Status 0 - Fully active, able to carry on all pre-disease performance without restriction [Normal] : affect appropriate [de-identified] : S/P Rt mast, cw neg, S/P Rt alnd, L unremarkable, no palp mass or LN

## 2025-05-29 NOTE — PHYSICAL EXAM
[Fully active, able to carry on all pre-disease performance without restriction] : Status 0 - Fully active, able to carry on all pre-disease performance without restriction [Normal] : affect appropriate [de-identified] : S/P Rt mast, cw neg, S/P Rt alnd, L unremarkable, no palp mass or LN

## 2025-05-29 NOTE — BEGINNING OF VISIT
[0] : 2) Feeling down, depressed, or hopeless: Not at all (0) [Never] : Never [Date Discussed (MM/DD/YY): ___] : Discussed: [unfilled] [With Patient/Caregiver] : with Patient/Caregiver [[id number]] : 0 [Abdominal Pain] : no abdominal pain [Vomiting] : no vomiting [Constipation] : no constipation

## 2025-05-29 NOTE — BEGINNING OF VISIT
[0] : 2) Feeling down, depressed, or hopeless: Not at all (0) [Never] : Never [Date Discussed (MM/DD/YY): ___] : Discussed: [unfilled] [With Patient/Caregiver] : with Patient/Caregiver [[id number]] : 0 [Abdominal Pain] : no abdominal pain [Vomiting] : no vomiting [Constipation] : no constipation

## 2025-05-29 NOTE — HISTORY OF PRESENT ILLNESS
[de-identified] : No family Hx of breast CA, pt has personal and family hx prostate CA. Several months ago palpated density Rt breast, subsequently noted nipple retraction.  He promptly sought medical attention and workup included mammo and US, confirmed the presence of a 2.7 cm mass upper outer subareolar region corresponding to the palpable mass, and Lt gynecomastia.  US-guided bx revealed IDC and an axillary LN FNA was consistent with metastatic disease.  Path - mod diff IDC, ER pos, HER-2/caitlin neg.  PET/CT remarkable for Rt breast avidity and Rt axillary adenopathy, minimally avid Rt lower lobe atelectasis/consolidation with associated bronchiectasis, probably inflammatory, attention on F/U, non-avid Rt sclerotic bone lesion, probably benign, less likely metastatic.  21, Rt mast and axillary dissection, poorly diff IDC grade 3, (3, 2, 3) with necrosis, solid papillary features, measures 2.5 cm, involves dermis of the nipple without epidermal involvement, no definite LVI, DCIS focally present with intermediate grade nuclei, margins neg for invasive carcinoma and DCIS.  / SLN pos 0.5 cm,  LNs pos for metastatic disease, 1.4 cm with extranodal extension, ER and [place] >95%, Ki-67 30% and HER-2 2+.  Oncotype testing was done prior to surgery on core bx - ORS 15,  P/O course eventful for seroma -drain ed again last week - has not reaccumulated. Getting PT locally. Pathology slides were reviewed and case discussed at Tumor Board 22 -  Consensus consider chemo.  Pt has an aggressive tumor as per pathology review despite Oncotype 15. Data is limited regarding Oncotype in males.  Pt has been seen for RT. 22 TC#1,   Spoke to the pt tat 7 PM and again at 10:30 PM.  After eating dinner a steak and lettuce, patient felt queasy, had frequent runny watery stool.  At 7 PM, patient took Imodium and again at 10 PM, he took Imodium again.  Patient also felt queasy, he took Ondansetron.  He has no cramping, abdominal pain, fever, urinary symptoms, cough, chest pain or SOB.  Spoke to the pt again at 8:30 AM, 2/1 he noted that he had 1 more BM at 1 AM and again this morning- subsequently resolved. Saw GI on 2/3 - no further intervention.  Did not take Claritin - had mild bone pain. Appetite poor for 1 week - lost 8 lbs - then regained - back to baseline. Fatigue resolved - no significant hair loss. After cycle#2 mild nausea - took ondansetron, fatigue for 7 days, bone pain D3-6 - did not take Claritin, did not have diarrhea. Had anorexia, loss of taste - lost 8 lbs and gained back. All sx resolved. Some hair shedding. S/P  TC 3/9/22. 3/17 had rash on arms and anterior chest wall and neck - seen on Zoom -erythematous macular rash, pruritic, suspect due to chemo.  Pt was advised to see dermatologist, was started on Dexamethasone 4 mg then and 2 mg in pm as well as to take Benadryl 25-50 mg.  Pt was contacted later in the evening, he took 8 mg of Decadron.  He will take 4 mg in pm and 2 mg in am.  Pt noted on , 3/20/22, rash had resolved, Monday had recurred on 3/21/22, initially had itching of his palms and soles, no rash there, then later developed rash on his arms.  Pt was advised to take 4 mg and then 2 mg followed by 2 mg of Decadron.  Pt was seen by dermatologist on 22, rash had resolved. Suspect rash due to docetaxel. Discussed d/c rx after  planned adjuvant rx or to give 1 cycle CTX D1  with wkly abraxane d1, 8, 15 - with cold cap. Discussed concerns re anaphylaxis with continuing taxotere. Reviewed risks, benefits and SE due to rx. S/P 2 3 abraxane- had diarrhea after cycle 1 assoc with steak dinner- did not have after # 2 or 3. S/P RT. On early since 22 - no siginif SE. No fatigue resolved, no hair loss or paresthesia.  Exercising regularly. 22 L mammo/us 7 o'clock 8mm  abn- core bx 22 - benign. 23, saw Derm, precancer, Lt eyebrow, had COVID in February, took Paxlovid.  23, no new complaints.  Saw Dr. Larkin in July.  Tolerating Early without incident.  Scheduled for Derm F/U.  Pt saw urologist in .  PSA reportedly WNL. 1/10/24 Viagra regularly - - mid December - unable to maintain erection.  held Early for 3 days - resolved. Back on early without [id number]/23L mammo/US - [facility]. Urol eval next week. - ? early. 24 Has since taken 2 other breaks - - , ..  saw derm   PMH: , radical prostatectomy, PSA wnl normal limits.  Hx S/P colon resection .  Colonoscopy  with UGI.  Hx colonic polyps.  Hx carotid plaque.  No Hx DVT.  Hx Sampson's, reportedly resolved.  Hx GERD and asthma.  Had  flu vax. Up to date pneumonia vax and shingrix.  Had 3 COVID Pfizer vax, last .  Has healthcare proxy and advance directive. BMD - osteopenia  FAMILY HX: Invitae 47-gene panel neg .[place] descent.  Mother 86, colon CA at 85 and had 2 brothers.  Father  at 75, had 2 brothers and 1 sister.  Pt had 1 brother, prostate CA 66, A&W 72, and has 2 daughters.    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  Exercises 5/week.  Heterosexual and sexually active.  [de-identified] : 10/29/24  Feeling well exercises regularly 1 beer/wk q 45 day holds Metcalf for 4 days - sexual fx improves Urol f/u sched for December Had flu vax and covid vax  5/29/25 BMD 4/9/24 @ [facility] - lumbar spine normal, Lt hip osteopenia Lt MMG/US 12/11/24 @ [facility] in [place] - maria alejandra in Lt breast continues tamoxifen 20mg - holds for a few days periodically d/t sexual dysfunction, continues Viagra colonoscopy w/ UGI 04/2025 - wnl, hx polyps, has a f/u in 07/2025 up to date w/ derm 4/3/25 - wnl saw ophthalmology 06/2024 - annual f/u urology - PSA checked 01/17/25 weight-lifting everyday weight/appetite stable 1 beer per week getting calcium through diet - 12oz glass of milk per day brother had gallbladder and liver cancer - surgery at [facility], seeing an oncologist for possible chemo

## 2025-05-29 NOTE — ASSESSMENT
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  I have consulted with the pathologist who consulted on the core bx, which was read as mod diff.  However, review of the mast, grade was increased to 3 based on mitotic count on the excision, nuclear grade was 2 in both samplings, Ki-67 was 25% on core and 30% on mast, in retrospect core was representative of the invasive carcinoma and there is no reason to repeat the Oncotype testing.  Oncotype was 15 on core.  Path and prognosis reviewed in detail with the pt.  Pt's risk for distant recurrence using node pos Oncotype 15 is in the range of 14%.  However - case reviewed at Tumor Board with detailed slide review - Pathology slides were reviewed, and case discussed at Tumor Board 01/12/22.  Consensus was to consider chemo.  Aggressive tumor as per pathology review despite Oncotype 15. Data is limited regarding Oncotype in males.  Discussed chemotherapy regimen TC -S/P  - suspect allergic rxn to docetaxel. S/P 1 cycle CTX D1 with abraxane D1, 8,  and 15 - tolerated well. S/P RT to cw and regional LN. Metcalf 6/27/22 - to continue takes occ break for sexual dysfx. Discussed METCALF SE using Viagra. Alternative would be lupron with AI - no sexual fx.. Reviewed exercise diet, and hydration. Continue Metcalf.   Mammogram/US left 12/24. CBC, chem and markers. F/U BP - wnl at home . Urol eval 1/25.F/U 6 months  [AdvancecareDate] : 5/29/25

## 2025-05-29 NOTE — BEGINNING OF VISIT
[0] : 2) Feeling down, depressed, or hopeless: Not at all (0) [Never] : Never [Date Discussed (MM/DD/YY): ___] : Discussed: [unfilled] [With Patient/Caregiver] : with Patient/Caregiver [[id number]] : 0 [Abdominal Pain] : no abdominal pain [Vomiting] : no vomiting [Constipation] : no constipation

## 2025-05-29 NOTE — HISTORY OF PRESENT ILLNESS
[de-identified] : No family Hx of breast CA, pt has personal and family hx prostate CA. Several months ago palpated density Rt breast, subsequently noted nipple retraction.  He promptly sought medical attention and workup included mammo and US, confirmed the presence of a 2.7 cm mass upper outer subareolar region corresponding to the palpable mass, and Lt gynecomastia.  US-guided bx revealed IDC and an axillary LN FNA was consistent with metastatic disease.  Path - mod diff IDC, ER pos, HER-2/caitlin neg.  PET/CT remarkable for Rt breast avidity and Rt axillary adenopathy, minimally avid Rt lower lobe atelectasis/consolidation with associated bronchiectasis, probably inflammatory, attention on F/U, non-avid Rt sclerotic bone lesion, probably benign, less likely metastatic.  21, Rt mast and axillary dissection, poorly diff IDC grade 3, (3, 2, 3) with necrosis, solid papillary features, measures 2.5 cm, involves dermis of the nipple without epidermal involvement, no definite LVI, DCIS focally present with intermediate grade nuclei, margins neg for invasive carcinoma and DCIS.  / SLN pos 0.5 cm,  LNs pos for metastatic disease, 1.4 cm with extranodal extension, ER and [place] >95%, Ki-67 30% and HER-2 2+.  Oncotype testing was done prior to surgery on core bx - ORS 15,  P/O course eventful for seroma -drain ed again last week - has not reaccumulated. Getting PT locally. Pathology slides were reviewed and case discussed at Tumor Board 22 -  Consensus consider chemo.  Pt has an aggressive tumor as per pathology review despite Oncotype 15. Data is limited regarding Oncotype in males.  Pt has been seen for RT. 22 TC#1,   Spoke to the pt tat 7 PM and again at 10:30 PM.  After eating dinner a steak and lettuce, patient felt queasy, had frequent runny watery stool.  At 7 PM, patient took Imodium and again at 10 PM, he took Imodium again.  Patient also felt queasy, he took Ondansetron.  He has no cramping, abdominal pain, fever, urinary symptoms, cough, chest pain or SOB.  Spoke to the pt again at 8:30 AM, 2/1 he noted that he had 1 more BM at 1 AM and again this morning- subsequently resolved. Saw GI on 2/3 - no further intervention.  Did not take Claritin - had mild bone pain. Appetite poor for 1 week - lost 8 lbs - then regained - back to baseline. Fatigue resolved - no significant hair loss. After cycle#2 mild nausea - took ondansetron, fatigue for 7 days, bone pain D3-6 - did not take Claritin, did not have diarrhea. Had anorexia, loss of taste - lost 8 lbs and gained back. All sx resolved. Some hair shedding. S/P  TC 3/9/22. 3/17 had rash on arms and anterior chest wall and neck - seen on Zoom -erythematous macular rash, pruritic, suspect due to chemo.  Pt was advised to see dermatologist, was started on Dexamethasone 4 mg then and 2 mg in pm as well as to take Benadryl 25-50 mg.  Pt was contacted later in the evening, he took 8 mg of Decadron.  He will take 4 mg in pm and 2 mg in am.  Pt noted on , 3/20/22, rash had resolved, Monday had recurred on 3/21/22, initially had itching of his palms and soles, no rash there, then later developed rash on his arms.  Pt was advised to take 4 mg and then 2 mg followed by 2 mg of Decadron.  Pt was seen by dermatologist on 22, rash had resolved. Suspect rash due to docetaxel. Discussed d/c rx after  planned adjuvant rx or to give 1 cycle CTX D1  with wkly abraxane d1, 8, 15 - with cold cap. Discussed concerns re anaphylaxis with continuing taxotere. Reviewed risks, benefits and SE due to rx. S/P 2 3 abraxane- had diarrhea after cycle 1 assoc with steak dinner- did not have after # 2 or 3. S/P RT. On early since 22 - no siginif SE. No fatigue resolved, no hair loss or paresthesia.  Exercising regularly. 22 L mammo/us 7 o'clock 8mm  abn- core bx 22 - benign. 23, saw Derm, precancer, Lt eyebrow, had COVID in February, took Paxlovid.  23, no new complaints.  Saw Dr. Larkin in July.  Tolerating Early without incident.  Scheduled for Derm F/U.  Pt saw urologist in .  PSA reportedly WNL. 1/10/24 Viagra regularly - - mid December - unable to maintain erection.  held Early for 3 days - resolved. Back on early without [id number]/23L mammo/US - [facility]. Urol eval next week. - ? early. 24 Has since taken 2 other breaks - - , ..  saw derm   PMH: , radical prostatectomy, PSA wnl normal limits.  Hx S/P colon resection .  Colonoscopy  with UGI.  Hx colonic polyps.  Hx carotid plaque.  No Hx DVT.  Hx Sampson's, reportedly resolved.  Hx GERD and asthma.  Had  flu vax. Up to date pneumonia vax and shingrix.  Had 3 COVID Pfizer vax, last .  Has healthcare proxy and advance directive. BMD - osteopenia  FAMILY HX: Invitae 47-gene panel neg .Pashto descent.  Mother 86, colon CA at 85 and had 2 brothers.  Father  at 75, had 2 brothers and 1 sister.  Pt had 1 brother, prostate CA 66, A&W 72, and has 2 daughters.    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  Exercises 5/week.  Heterosexual and sexually active.  [de-identified] : 10/29/24  Feeling well exercises regularly 1 beer/wk q 45 day holds Metcalf for 4 days - sexual fx improves Urol f/u sched for December Had flu vax and covid vax  5/29/25 BMD 4/9/24 @ [facility] - lumbar spine normal, Lt hip osteopenia Lt MMG/US 12/11/24 @ [facility] in [place] - maria alejandra in Lt breast continues tamoxifen 20mg - holds for a few days periodically d/t sexual dysfunction, continues Viagra colonoscopy w/ UGI 04/2025 - wnl, hx polyps, has a f/u in 07/2025 up to date w/ derm 4/3/25 - wnl saw ophthalmology 06/2024 - annual f/u urology - PSA checked 01/17/25 weight-lifting everyday weight/appetite stable 1 beer per week getting calcium through diet - 12oz glass of milk per day brother had gallbladder and liver cancer - surgery at [facility], seeing an oncologist for possible chemo

## 2025-05-29 NOTE — HISTORY OF PRESENT ILLNESS
[de-identified] : No family Hx of breast CA, pt has personal and family hx prostate CA. Several months ago palpated density Rt breast, subsequently noted nipple retraction.  He promptly sought medical attention and workup included mammo and US, confirmed the presence of a 2.7 cm mass upper outer subareolar region corresponding to the palpable mass, and Lt gynecomastia.  US-guided bx revealed IDC and an axillary LN FNA was consistent with metastatic disease.  Path - mod diff IDC, ER pos, HER-2/caitlin neg.  PET/CT remarkable for Rt breast avidity and Rt axillary adenopathy, minimally avid Rt lower lobe atelectasis/consolidation with associated bronchiectasis, probably inflammatory, attention on F/U, non-avid Rt sclerotic bone lesion, probably benign, less likely metastatic.  21, Rt mast and axillary dissection, poorly diff IDC grade 3, (3, 2, 3) with necrosis, solid papillary features, measures 2.5 cm, involves dermis of the nipple without epidermal involvement, no definite LVI, DCIS focally present with intermediate grade nuclei, margins neg for invasive carcinoma and DCIS.  / SLN pos 0.5 cm,  LNs pos for metastatic disease, 1.4 cm with extranodal extension, ER and [place] >95%, Ki-67 30% and HER-2 2+.  Oncotype testing was done prior to surgery on core bx - ORS 15,  P/O course eventful for seroma -drain ed again last week - has not reaccumulated. Getting PT locally. Pathology slides were reviewed and case discussed at Tumor Board 22 -  Consensus consider chemo.  Pt has an aggressive tumor as per pathology review despite Oncotype 15. Data is limited regarding Oncotype in males.  Pt has been seen for RT. 22 TC#1,   Spoke to the pt tat 7 PM and again at 10:30 PM.  After eating dinner a steak and lettuce, patient felt queasy, had frequent runny watery stool.  At 7 PM, patient took Imodium and again at 10 PM, he took Imodium again.  Patient also felt queasy, he took Ondansetron.  He has no cramping, abdominal pain, fever, urinary symptoms, cough, chest pain or SOB.  Spoke to the pt again at 8:30 AM, 2/1 he noted that he had 1 more BM at 1 AM and again this morning- subsequently resolved. Saw GI on 2/3 - no further intervention.  Did not take Claritin - had mild bone pain. Appetite poor for 1 week - lost 8 lbs - then regained - back to baseline. Fatigue resolved - no significant hair loss. After cycle#2 mild nausea - took ondansetron, fatigue for 7 days, bone pain D3-6 - did not take Claritin, did not have diarrhea. Had anorexia, loss of taste - lost 8 lbs and gained back. All sx resolved. Some hair shedding. S/P  TC 3/9/22. 3/17 had rash on arms and anterior chest wall and neck - seen on Zoom -erythematous macular rash, pruritic, suspect due to chemo.  Pt was advised to see dermatologist, was started on Dexamethasone 4 mg then and 2 mg in pm as well as to take Benadryl 25-50 mg.  Pt was contacted later in the evening, he took 8 mg of Decadron.  He will take 4 mg in pm and 2 mg in am.  Pt noted on , 3/20/22, rash had resolved, Monday had recurred on 3/21/22, initially had itching of his palms and soles, no rash there, then later developed rash on his arms.  Pt was advised to take 4 mg and then 2 mg followed by 2 mg of Decadron.  Pt was seen by dermatologist on 22, rash had resolved. Suspect rash due to docetaxel. Discussed d/c rx after  planned adjuvant rx or to give 1 cycle CTX D1  with wkly abraxane d1, 8, 15 - with cold cap. Discussed concerns re anaphylaxis with continuing taxotere. Reviewed risks, benefits and SE due to rx. S/P 2 3 abraxane- had diarrhea after cycle 1 assoc with steak dinner- did not have after # 2 or 3. S/P RT. On early since 22 - no siginif SE. No fatigue resolved, no hair loss or paresthesia.  Exercising regularly. 22 L mammo/us 7 o'clock 8mm  abn- core bx 22 - benign. 23, saw Derm, precancer, Lt eyebrow, had COVID in February, took Paxlovid.  23, no new complaints.  Saw Dr. Larkin in July.  Tolerating Early without incident.  Scheduled for Derm F/U.  Pt saw urologist in .  PSA reportedly WNL. 1/10/24 Viagra regularly - - mid December - unable to maintain erection.  held Early for 3 days - resolved. Back on early without [id number] mammo/US - [facility]. Urol eval next week. - ? early. 24 Has since taken 2 other breaks - - , ..  saw derm   PMH: , radical prostatectomy, PSA wnl normal limits.  Hx S/P colon resection .  Colonoscopy  with UGI.  Hx colonic polyps.  Hx carotid plaque.  No Hx DVT.  Hx Sampson's, reportedly resolved.  Hx GERD and asthma.  Had  flu vax. Up to date pneumonia vax and shingrix.  Had 3 COVID Pfizer vax, last .  Has healthcare proxy and advance directive. BMD - osteopenia  FAMILY HX: Invitae 47-gene panel neg .[place] descent.  Mother 86, colon CA at 85 and had 2 brothers.  Father  at 75, had 2 brothers and 1 sister.  Pt had 1 brother, prostate CA 66, A&W 72, and has 2 daughters.    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  Exercises 5/week.  Heterosexual and sexually active.  [de-identified] : 10/29/24  Feeling well exercises regularly 1 beer/wk q 45 day holds Metcalf for 4 days - sexual fx improves Urol f/u sched for December Had flu vax and covid vax  5/29/25 BMD 4/9/24 @ [facility] - lumbar spine normal, Lt hip osteopenia Lt MMG/US 12/11/24 @ [facility] in [place] - maria alejandra in Lt breast continues tamoxifen 20mg - holds for a few days periodically d/t sexual dysfunction, continues Viagra colonoscopy w/ UGI 04/2025 - wnl, hx polyps, has a f/u in 07/2025 up to date w/ derm 4/3/25 - wnl saw ophthalmology 06/2024 - annual f/u urology - PSA checked 01/17/25 weight-lifting everyday weight/appetite stable 1 beer per week getting calcium through diet - 12oz glass of milk per day brother had gallbladder and liver cancer - surgery at [facility], seeing an oncologist for possible chemo

## 2025-05-29 NOTE — PHYSICAL EXAM
[Fully active, able to carry on all pre-disease performance without restriction] : Status 0 - Fully active, able to carry on all pre-disease performance without restriction [Normal] : affect appropriate [de-identified] : S/P Rt mast, cw neg, S/P Rt alnd, L unremarkable, no palp mass or LN

## 2025-05-29 NOTE — ASSESSMENT
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  I have consulted with the pathologist who consulted on the core bx, which was read as mod diff.  However, review of the mast, grade was increased to 3 based on mitotic count on the excision, nuclear grade was 2 in both samplings, Ki-67 was 25% on core and 30% on mast, in retrospect core was representative of the invasive carcinoma and there is no reason to repeat the Oncotype testing.  Oncotype was 15 on core.  Path and prognosis reviewed in detail with the pt.  Pt's risk for distant recurrence using node pos Oncotype 15 is in the range of 14%.  However - case reviewed at Tumor Board with detailed slide review - Pathology slides were reviewed, and case discussed at Tumor Board 01/12/22.  Consensus was to consider chemo.  Aggressive tumor as per pathology review despite Oncotype 15. Data is limited regarding Oncotype in males.  Discussed chemotherapy regimen TC -S/P  - suspect allergic rxn to docetaxel. S/P 1 cycle CTX D1 with abraxane D1, 8,  and 15 - tolerated well. S/P RT to cw and regional LN. Metcalf 6/27/22 - to continue takes occ break for sexual dysfx. Discussed METCALF SE using Viagra. Alternative would be lupron with AI - no sexual fx.. Reviewed exercise diet, and hydration. Continue Metcalf.   Mammogram/US left 12/24. CBC, chem and markers. F/U BP - wnl at home . Urol eval 1/25.F/U 6 months  [AdvancecareDate] : 5/29/25

## 2025-05-29 NOTE — BEGINNING OF VISIT
[0] : 2) Feeling down, depressed, or hopeless: Not at all (0) [Never] : Never [Date Discussed (MM/DD/YY): ___] : Discussed: [unfilled] [With Patient/Caregiver] : with Patient/Caregiver [[id number]] : 0 [Abdominal Pain] : no abdominal pain [Vomiting] : no vomiting [Constipation] : no constipation